# Patient Record
Sex: FEMALE | Race: WHITE | NOT HISPANIC OR LATINO | Employment: FULL TIME | URBAN - METROPOLITAN AREA
[De-identification: names, ages, dates, MRNs, and addresses within clinical notes are randomized per-mention and may not be internally consistent; named-entity substitution may affect disease eponyms.]

---
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
## 2019-01-30 DIAGNOSIS — N92.1 BREAKTHROUGH BLEEDING ON NEXPLANON: ICD-10-CM

## 2019-01-30 DIAGNOSIS — Z97.5 BREAKTHROUGH BLEEDING ON NEXPLANON: ICD-10-CM

## 2019-01-30 RX ORDER — NORGESTIMATE AND ETHINYL ESTRADIOL 0.25-0.035
1 KIT ORAL DAILY
Qty: 84 TABLET | Refills: 0 | Status: SHIPPED | OUTPATIENT
Start: 2019-01-30 | End: 2019-05-09

## 2019-01-30 NOTE — TELEPHONE ENCOUNTER
12/5/17 last office visit   Future appointment currently not scheduled.  Patient due for annual visit.  Called patient to notify, unable to reach, unable to leave a message as voice mail has not been set up. Noted on prescription refill that OFFICE VISIT is needed for additional refills.    Dasia Dhillon   [facility]  RN

## 2019-05-09 DIAGNOSIS — N92.1 BREAKTHROUGH BLEEDING ON NEXPLANON: ICD-10-CM

## 2019-05-09 DIAGNOSIS — Z97.5 BREAKTHROUGH BLEEDING ON NEXPLANON: ICD-10-CM

## 2019-05-09 RX ORDER — NORGESTIMATE AND ETHINYL ESTRADIOL 0.25-0.035
1 KIT ORAL DAILY
Qty: 84 TABLET | Refills: 0 | Status: SHIPPED | OUTPATIENT
Start: 2019-05-09 | End: 2019-05-16

## 2019-05-09 NOTE — TELEPHONE ENCOUNTER
Future appointment 5/16/19.    Refill through to appointment approved.    Dasia Dhillon   [facility]  RN

## 2019-05-16 ENCOUNTER — OFFICE VISIT (OUTPATIENT)
Dept: OBGYN | Facility: CLINIC | Age: 19
End: 2019-05-16
Payer: COMMERCIAL

## 2019-05-16 VITALS
BODY MASS INDEX: 23.9 KG/M2 | TEMPERATURE: 98.3 F | DIASTOLIC BLOOD PRESSURE: 76 MMHG | WEIGHT: 140 LBS | HEART RATE: 82 BPM | RESPIRATION RATE: 16 BRPM | SYSTOLIC BLOOD PRESSURE: 121 MMHG | HEIGHT: 64 IN

## 2019-05-16 DIAGNOSIS — Z30.41 ENCOUNTER FOR SURVEILLANCE OF CONTRACEPTIVE PILLS: Primary | ICD-10-CM

## 2019-05-16 DIAGNOSIS — Z11.3 SCREEN FOR STD (SEXUALLY TRANSMITTED DISEASE): ICD-10-CM

## 2019-05-16 PROCEDURE — 87491 CHLMYD TRACH DNA AMP PROBE: CPT | Performed by: OBSTETRICS & GYNECOLOGY

## 2019-05-16 PROCEDURE — 99213 OFFICE O/P EST LOW 20 MIN: CPT | Performed by: OBSTETRICS & GYNECOLOGY

## 2019-05-16 PROCEDURE — 87591 N.GONORRHOEAE DNA AMP PROB: CPT | Performed by: OBSTETRICS & GYNECOLOGY

## 2019-05-16 RX ORDER — NORGESTIMATE AND ETHINYL ESTRADIOL 0.25-0.035
1 KIT ORAL DAILY
Qty: 84 TABLET | Refills: 4 | Status: SHIPPED | OUTPATIENT
Start: 2019-05-16 | End: 2020-05-21

## 2019-05-16 ASSESSMENT — MIFFLIN-ST. JEOR: SCORE: 1395.04

## 2019-05-16 NOTE — NURSING NOTE
"Initial /76 (BP Location: Left arm, Patient Position: Sitting, Cuff Size: Adult Regular)   Pulse 82   Temp 98.3  F (36.8  C) (Tympanic)   Resp 16   Ht 1.626 m (5' 4\")   Wt 63.5 kg (140 lb)   LMP 05/16/2019   Breastfeeding? No   BMI 24.03 kg/m   Estimated body mass index is 24.03 kg/m  as calculated from the following:    Height as of this encounter: 1.626 m (5' 4\").    Weight as of this encounter: 63.5 kg (140 lb). .    Callie Fletcher, [facility]    "

## 2019-05-16 NOTE — PROGRESS NOTES
"  SUBJECTIVE:                                                   CC:  Patient presents with:  Consult      HPI:  Angeles Duenas is a 18 year old  who presents for OCP surveillance.  No migraine, HTN, cardiac disease, liver or gallbladder dz.  No h/o VTE.  No issues remembering to take the pills.  S/p nexplanon, had BTB on it and had it removed 1.5 years ago.      ROS: 10 point ROS negative other than as listed above in HPI.    Gyn History:  Patient's last menstrual period was 2019.     Patient is sexually active.  Using oral contraceptives for contraception.     PMH, PSH, Soc Hx, Fam Hx, Meds, and allergies reviewed in Epic.    OBJECTIVE:     /76 (BP Location: Left arm, Patient Position: Sitting, Cuff Size: Adult Regular)   Pulse 82   Temp 98.3  F (36.8  C) (Tympanic)   Resp 16   Ht 1.626 m (5' 4\")   Wt 63.5 kg (140 lb)   LMP 2019   Breastfeeding? No   BMI 24.03 kg/m      Gen: Healthy appearing thin female, no acute distress, comfortable     ASSESSMENT/PLAN:                                                      1. Encounter for surveillance of contraceptive pills  Discussed ways to use continuously.  No contraindication at this time to continuing OCP use.  Failed nexplanon in the past.  We did discuss IUD for consideration in the future.    - norgestimate-ethinyl estradiol (ORTHO-CYCLEN/SPRINTEC) 0.25-35 MG-MCG tablet; Take 1 tablet by mouth daily  Dispense: 84 tablet; Refill: 4    2. Screen for STD (sexually transmitted disease)  - Neisseria gonorrhoeae PCR  - Chlamydia trachomatis PCR      Alejandra Cho MD, MPH  Obstetrics and Gynecology     Total time spent was 15 minutes; greater than 50% of time was spent in counseling and/or coordination of care for the above listed diagnoses, not including time spent on the procedure.   "

## 2019-05-17 LAB
C TRACH DNA SPEC QL NAA+PROBE: NEGATIVE
N GONORRHOEA DNA SPEC QL NAA+PROBE: NEGATIVE
SPECIMEN SOURCE: NORMAL
SPECIMEN SOURCE: NORMAL

## 2019-07-08 ENCOUNTER — TELEPHONE (OUTPATIENT)
Dept: OBGYN | Facility: CLINIC | Age: 19
End: 2019-07-08

## 2019-07-08 NOTE — TELEPHONE ENCOUNTER
Reason for call:  Patient reporting a symptom    Symptom or request: Got her period a week before she was on the sugar pills - was really heavy and then she still has it this week while on sugar pills - pretty heavy and consistent.  Just concerned as has not happened to her before.    Duration (how long have symptoms been present): 2 wks    Have you been treated for this before? No    Additional comments:     Phone Number patient can be reached at:  Cell number on file:    Telephone Information:   Mobile [phone]       Best Time:  any    Can we leave a detailed message on this number:  YES    Call taken on 7/8/2019 at 4:23 PM by Rosy Cortes

## 2019-07-08 NOTE — TELEPHONE ENCOUNTER
S-(situation): Started menses 4-5 days early.    B-(background): Pt been on this ocp several yrs.    A-(assessment): Period started off approx 4-5 days early with light bleeding and now has progressed to be like her regular period.  She reports that she is not having heavy bleeding nor light bleeding.  Concerned about pregnancy.  She has been taking her ocp faithfully everyday at the same time.    R-(recommendations): Pt advised that it is unlikely that she is pregnant if on menses at this time.  She can purchase home pregnancy test for reassurance and will have Dr. Cho review and advise if needs different ocp.    Krystal Mendoza  [facility] RN

## 2019-07-12 NOTE — TELEPHONE ENCOUNTER
Agree with RN assessment.  Nothing to worry about at this time; can rule out pregnancy with home UPT.  Would otherwise keep her on the same OCP for now.    Alejandra Cho MD, MPH  [facility]

## 2019-07-12 NOTE — TELEPHONE ENCOUNTER
Called patient and informed of Dr. Jeronimo Cho's recommendation below.     Tyron MEI RN   Specialty Clinics

## 2019-07-15 ENCOUNTER — RECORDS - HEALTHEAST (OUTPATIENT)
Dept: LAB | Facility: CLINIC | Age: 19
End: 2019-07-15

## 2019-07-16 LAB
C TRACH DNA SPEC QL PROBE+SIG AMP: NEGATIVE
N GONORRHOEA DNA SPEC QL NAA+PROBE: NEGATIVE

## 2019-07-24 ENCOUNTER — HOSPITAL ENCOUNTER (EMERGENCY)
Facility: CLINIC | Age: 19
Discharge: HOME | End: 2019-07-24
Attending: STUDENT IN AN ORGANIZED HEALTH CARE EDUCATION/TRAINING PROGRAM | Admitting: STUDENT IN AN ORGANIZED HEALTH CARE EDUCATION/TRAINING PROGRAM
Payer: COMMERCIAL

## 2019-07-24 ENCOUNTER — APPOINTMENT (OUTPATIENT)
Dept: CT IMAGING | Facility: CLINIC | Age: 19
End: 2019-07-24
Attending: STUDENT IN AN ORGANIZED HEALTH CARE EDUCATION/TRAINING PROGRAM
Payer: COMMERCIAL

## 2019-07-24 VITALS
BODY MASS INDEX: 22.88 KG/M2 | HEIGHT: 64 IN | OXYGEN SATURATION: 100 % | DIASTOLIC BLOOD PRESSURE: 79 MMHG | SYSTOLIC BLOOD PRESSURE: 123 MMHG | TEMPERATURE: 98.7 F | WEIGHT: 134 LBS | RESPIRATION RATE: 18 BRPM

## 2019-07-24 DIAGNOSIS — M54.50 ACUTE LEFT-SIDED LOW BACK PAIN WITHOUT SCIATICA: ICD-10-CM

## 2019-07-24 DIAGNOSIS — R31.0 GROSS HEMATURIA: ICD-10-CM

## 2019-07-24 LAB
ALBUMIN SERPL-MCNC: 3.6 G/DL (ref 3.4–5)
ALBUMIN UR-MCNC: NEGATIVE MG/DL
ALP SERPL-CCNC: 72 U/L (ref 40–150)
ALT SERPL W P-5'-P-CCNC: 18 U/L (ref 0–50)
ANION GAP SERPL CALCULATED.3IONS-SCNC: 5 MMOL/L (ref 3–14)
APPEARANCE UR: ABNORMAL
AST SERPL W P-5'-P-CCNC: 14 U/L (ref 0–35)
B-HCG SERPL-ACNC: <1 IU/L (ref 0–5)
BASOPHILS # BLD AUTO: 0.1 10E9/L (ref 0–0.2)
BASOPHILS NFR BLD AUTO: 0.5 %
BILIRUB SERPL-MCNC: 0.3 MG/DL (ref 0.2–1.3)
BILIRUB UR QL STRIP: NEGATIVE
BUN SERPL-MCNC: 9 MG/DL (ref 7–30)
CALCIUM SERPL-MCNC: 8.6 MG/DL (ref 8.5–10.1)
CHLORIDE SERPL-SCNC: 107 MMOL/L (ref 96–110)
CO2 SERPL-SCNC: 28 MMOL/L (ref 20–32)
COLOR UR AUTO: YELLOW
CREAT SERPL-MCNC: 0.65 MG/DL (ref 0.5–1)
DIFFERENTIAL METHOD BLD: NORMAL
EOSINOPHIL # BLD AUTO: 0.1 10E9/L (ref 0–0.7)
EOSINOPHIL NFR BLD AUTO: 0.5 %
ERYTHROCYTE [DISTWIDTH] IN BLOOD BY AUTOMATED COUNT: 11.3 % (ref 10–15)
GFR SERPL CREATININE-BSD FRML MDRD: >90 ML/MIN/{1.73_M2}
GLUCOSE SERPL-MCNC: 94 MG/DL (ref 70–99)
GLUCOSE UR STRIP-MCNC: NEGATIVE MG/DL
HCT VFR BLD AUTO: 39.7 % (ref 35–47)
HGB BLD-MCNC: 13 G/DL (ref 11.7–15.7)
HGB UR QL STRIP: ABNORMAL
IMM GRANULOCYTES # BLD: 0 10E9/L (ref 0–0.4)
IMM GRANULOCYTES NFR BLD: 0.3 %
KETONES UR STRIP-MCNC: NEGATIVE MG/DL
LEUKOCYTE ESTERASE UR QL STRIP: NEGATIVE
LIPASE SERPL-CCNC: 81 U/L (ref 73–393)
LYMPHOCYTES # BLD AUTO: 2.5 10E9/L (ref 0.8–5.3)
LYMPHOCYTES NFR BLD AUTO: 26.6 %
MCH RBC QN AUTO: 28.8 PG (ref 26.5–33)
MCHC RBC AUTO-ENTMCNC: 32.7 G/DL (ref 31.5–36.5)
MCV RBC AUTO: 88 FL (ref 78–100)
MONOCYTES # BLD AUTO: 0.4 10E9/L (ref 0–1.3)
MONOCYTES NFR BLD AUTO: 4.2 %
MUCOUS THREADS #/AREA URNS LPF: PRESENT /LPF
NEUTROPHILS # BLD AUTO: 6.3 10E9/L (ref 1.6–8.3)
NEUTROPHILS NFR BLD AUTO: 67.9 %
NITRATE UR QL: NEGATIVE
NRBC # BLD AUTO: 0 10*3/UL
NRBC BLD AUTO-RTO: 0 /100
PH UR STRIP: 5 PH (ref 5–7)
PLATELET # BLD AUTO: 302 10E9/L (ref 150–450)
POTASSIUM SERPL-SCNC: 3.5 MMOL/L (ref 3.4–5.3)
PROT SERPL-MCNC: 6.7 G/DL (ref 6.8–8.8)
RBC # BLD AUTO: 4.52 10E12/L (ref 3.8–5.2)
RBC #/AREA URNS AUTO: 1 /HPF (ref 0–2)
SODIUM SERPL-SCNC: 140 MMOL/L (ref 133–144)
SOURCE: ABNORMAL
SP GR UR STRIP: 1.03 (ref 1–1.03)
SQUAMOUS #/AREA URNS AUTO: 3 /HPF (ref 0–1)
UROBILINOGEN UR STRIP-MCNC: 0 MG/DL (ref 0–2)
WBC # BLD AUTO: 9.3 10E9/L (ref 4–11)
WBC #/AREA URNS AUTO: 5 /HPF (ref 0–5)

## 2019-07-24 PROCEDURE — 84702 CHORIONIC GONADOTROPIN TEST: CPT | Performed by: STUDENT IN AN ORGANIZED HEALTH CARE EDUCATION/TRAINING PROGRAM

## 2019-07-24 PROCEDURE — 74177 CT ABD & PELVIS W/CONTRAST: CPT

## 2019-07-24 PROCEDURE — 96360 HYDRATION IV INFUSION INIT: CPT | Mod: 59 | Performed by: STUDENT IN AN ORGANIZED HEALTH CARE EDUCATION/TRAINING PROGRAM

## 2019-07-24 PROCEDURE — 96361 HYDRATE IV INFUSION ADD-ON: CPT | Performed by: STUDENT IN AN ORGANIZED HEALTH CARE EDUCATION/TRAINING PROGRAM

## 2019-07-24 PROCEDURE — 83690 ASSAY OF LIPASE: CPT | Performed by: STUDENT IN AN ORGANIZED HEALTH CARE EDUCATION/TRAINING PROGRAM

## 2019-07-24 PROCEDURE — 80053 COMPREHEN METABOLIC PANEL: CPT | Performed by: STUDENT IN AN ORGANIZED HEALTH CARE EDUCATION/TRAINING PROGRAM

## 2019-07-24 PROCEDURE — 25800030 ZZH RX IP 258 OP 636: Performed by: STUDENT IN AN ORGANIZED HEALTH CARE EDUCATION/TRAINING PROGRAM

## 2019-07-24 PROCEDURE — 81001 URINALYSIS AUTO W/SCOPE: CPT | Performed by: STUDENT IN AN ORGANIZED HEALTH CARE EDUCATION/TRAINING PROGRAM

## 2019-07-24 PROCEDURE — 99284 EMERGENCY DEPT VISIT MOD MDM: CPT | Mod: Z6 | Performed by: STUDENT IN AN ORGANIZED HEALTH CARE EDUCATION/TRAINING PROGRAM

## 2019-07-24 PROCEDURE — 85025 COMPLETE CBC W/AUTO DIFF WBC: CPT | Performed by: STUDENT IN AN ORGANIZED HEALTH CARE EDUCATION/TRAINING PROGRAM

## 2019-07-24 PROCEDURE — 25000125 ZZHC RX 250: Performed by: STUDENT IN AN ORGANIZED HEALTH CARE EDUCATION/TRAINING PROGRAM

## 2019-07-24 PROCEDURE — 25000128 H RX IP 250 OP 636: Performed by: STUDENT IN AN ORGANIZED HEALTH CARE EDUCATION/TRAINING PROGRAM

## 2019-07-24 PROCEDURE — 99285 EMERGENCY DEPT VISIT HI MDM: CPT | Mod: 25 | Performed by: STUDENT IN AN ORGANIZED HEALTH CARE EDUCATION/TRAINING PROGRAM

## 2019-07-24 RX ORDER — IOPAMIDOL 755 MG/ML
65 INJECTION, SOLUTION INTRAVASCULAR ONCE
Status: COMPLETED | OUTPATIENT
Start: 2019-07-24 | End: 2019-07-24

## 2019-07-24 RX ADMIN — SODIUM CHLORIDE 56 ML: 9 INJECTION, SOLUTION INTRAVENOUS at 21:49

## 2019-07-24 RX ADMIN — SODIUM CHLORIDE, POTASSIUM CHLORIDE, SODIUM LACTATE AND CALCIUM CHLORIDE 1000 ML: 600; 310; 30; 20 INJECTION, SOLUTION INTRAVENOUS at 20:25

## 2019-07-24 RX ADMIN — IOPAMIDOL 65 ML: 755 INJECTION, SOLUTION INTRAVENOUS at 21:49

## 2019-07-24 ASSESSMENT — MIFFLIN-ST. JEOR: SCORE: 1367.82

## 2019-07-25 NOTE — ED PROVIDER NOTES
History     Chief Complaint   Patient presents with     Hematuria     pt was seen by chiropractor today for back adjustment and went to pediatrician and was told blood in urine and was told to come to ER for CT scan to rule out kidney stone.      KAMI Duenas is a 18 year old female who presents for evaluation of left low back pain with hematuria.  Patient explains a nearly 2 weeks ago she had symptoms of gross hematuria without pain or discomfort, hematuria lasted around 1 day duration.  Early last week she began developing achy low back pain just above the gluteal region.  She saw chiropractor last week for an adjustment but did not obtain relief from left low back pain.  Patient again noted hematuria today with some dysuria prompting her to schedule a a primary clinic appointment, she was instructed to go to the department of possible kidney stone.  She denies fever, chills, chest pain, respiratory infectious symptoms, anterior abdominal pain, vaginal bleeding or discharge.  She had taken ibuprofen last at 12 PM with moderate improvement.  Declined analgesic medication now.    Allergies:  Allergies   Allergen Reactions     Amoxicillin Nausea     Penicillins Nausea       Problem List:    Patient Active Problem List    Diagnosis Date Noted     Encounter for surveillance of contraceptive pills 05/16/2019     Priority: Medium     Right knee pain 03/25/2016     Priority: Medium        Past Medical History:    Past Medical History:   Diagnosis Date     ADHD        Past Surgical History:    Past Surgical History:   Procedure Laterality Date     PE TUBES       TONSILLECTOMY & ADENOIDECTOMY         Family History:    Family History   Problem Relation Age of Onset     Hypertension Father      Hypertension Paternal Grandmother      Seasonal/Environmental Allergies Paternal Grandfather      Hypertension Paternal Grandfather      Heart Disease Brother         CHF       Social History:  Marital Status:  Single  "[1]  Social History     Tobacco Use     Smoking status: Never Smoker     Smokeless tobacco: Never Used   Substance Use Topics     Alcohol use: No     Drug use: Not on file        Medications:      amphetamine-dextroamphetamine (ADDERALL XR) 15 MG per 24 hr capsule   azelastine (ASTELIN) 0.1 % nasal spray   Fexofenadine HCl (MUCINEX ALLERGY PO)   fluticasone (FLONASE) 50 MCG/ACT spray   Loratadine (CLARITIN PO)   norgestimate-ethinyl estradiol (ORTHO-CYCLEN/SPRINTEC) 0.25-35 MG-MCG tablet         Review of Systems  Constitutional:  Negative for fever or chills.  Cardiovascular:  Negative for chest pain.  Respiratory:  Negative for cough or shortness of breath.  Gastrointestinal:  Negative for abdominal pain, vomiting, or diarrhea.  Denies blood with bowel movements.  Genitourinary: Positive for dysuria with gross hematuria.  Negative for pelvic pain, vaginal bleeding or discharge per  Musculoskeletal: Positive left low back pain.  Negative for midline back pain or recent injury.    All others reviewed and are negative.      Physical Exam   BP: 123/79  Heart Rate: 98  Temp: 98.7  F (37.1  C)  Resp: 18  Height: 162.6 cm (5' 4\")  Weight: 60.8 kg (134 lb)  SpO2: 100 %      Physical Exam  Constitutional:  Well developed, well nourished.  Appears nontoxic and in no acute distress.   HENT:  Normocephalic and atraumatic.  Symmetric in appearance.  Eyes:  Conjunctivae are normal.  Neck:  Neck supple.  Cardiovascular:  No cyanosis.  RRR.  No audible murmurs noted.    Respiratory:  Effort normal without sign of respiratory distress.  CTAB without diminished regions.   Gastrointestinal:  Soft nondistended abdomen.  Nontender and without guarding.  No rigidity or rebound tenderness.  Negative Solis's sign.  Negative McBurney's point.  Negative CVA tenderness.  Musculoskeletal:  Moves extremities spontaneously.  Left low back pain just above the gluteal musculature.  No step-offs or midline tenderness.  Neurological:  Patient is " alert.  Skin:  Skin is warm and dry.  Psychiatric:  Normal mood and affect.      ED Course        Procedures               Critical Care time:  none               Results for orders placed or performed during the hospital encounter of 07/24/19 (from the past 24 hour(s))   UA reflex to Microscopic and Culture   Result Value Ref Range    Color Urine Yellow     Appearance Urine Slightly Cloudy     Glucose Urine Negative NEG^Negative mg/dL    Bilirubin Urine Negative NEG^Negative    Ketones Urine Negative NEG^Negative mg/dL    Specific Gravity Urine 1.028 1.003 - 1.035    Blood Urine Large (A) NEG^Negative    pH Urine 5.0 5.0 - 7.0 pH    Protein Albumin Urine Negative NEG^Negative mg/dL    Urobilinogen mg/dL 0.0 0.0 - 2.0 mg/dL    Nitrite Urine Negative NEG^Negative    Leukocyte Esterase Urine Negative NEG^Negative    Source Midstream Urine     RBC Urine 1 0 - 2 /HPF    WBC Urine 5 0 - 5 /HPF    Squamous Epithelial /HPF Urine 3 (H) 0 - 1 /HPF    Mucous Urine Present (A) NEG^Negative /LPF   CBC with platelets differential   Result Value Ref Range    WBC 9.3 4.0 - 11.0 10e9/L    RBC Count 4.52 3.8 - 5.2 10e12/L    Hemoglobin 13.0 11.7 - 15.7 g/dL    Hematocrit 39.7 35.0 - 47.0 %    MCV 88 78 - 100 fl    MCH 28.8 26.5 - 33.0 pg    MCHC 32.7 31.5 - 36.5 g/dL    RDW 11.3 10.0 - 15.0 %    Platelet Count 302 150 - 450 10e9/L    Diff Method Automated Method     % Neutrophils 67.9 %    % Lymphocytes 26.6 %    % Monocytes 4.2 %    % Eosinophils 0.5 %    % Basophils 0.5 %    % Immature Granulocytes 0.3 %    Nucleated RBCs 0 0 /100    Absolute Neutrophil 6.3 1.6 - 8.3 10e9/L    Absolute Lymphocytes 2.5 0.8 - 5.3 10e9/L    Absolute Monocytes 0.4 0.0 - 1.3 10e9/L    Absolute Eosinophils 0.1 0.0 - 0.7 10e9/L    Absolute Basophils 0.1 0.0 - 0.2 10e9/L    Abs Immature Granulocytes 0.0 0 - 0.4 10e9/L    Absolute Nucleated RBC 0.0    Comprehensive metabolic panel   Result Value Ref Range    Sodium 140 133 - 144 mmol/L    Potassium 3.5 3.4  - 5.3 mmol/L    Chloride 107 96 - 110 mmol/L    Carbon Dioxide 28 20 - 32 mmol/L    Anion Gap 5 3 - 14 mmol/L    Glucose 94 70 - 99 mg/dL    Urea Nitrogen 9 7 - 30 mg/dL    Creatinine 0.65 0.50 - 1.00 mg/dL    GFR Estimate >90 >60 mL/min/[1.73_m2]    GFR Estimate If Black >90 >60 mL/min/[1.73_m2]    Calcium 8.6 8.5 - 10.1 mg/dL    Bilirubin Total 0.3 0.2 - 1.3 mg/dL    Albumin 3.6 3.4 - 5.0 g/dL    Protein Total 6.7 (L) 6.8 - 8.8 g/dL    Alkaline Phosphatase 72 40 - 150 U/L    ALT 18 0 - 50 U/L    AST 14 0 - 35 U/L   Lipase   Result Value Ref Range    Lipase 81 73 - 393 U/L   HCG quantitative pregnancy (blood)   Result Value Ref Range    HCG Quantitative Serum <1 0 - 5 IU/L   CT Abdomen Pelvis w Contrast    Narrative    CT ABDOMEN AND PELVIS WITH CONTRAST 7/24/2019 9:56 PM     HISTORY: Left low back pain with hematuria.    COMPARISON: None.    TECHNIQUE: Volumetric helical acquisition of CT images from the lung  bases through the symphysis pubis after the administration of 65 mL  Isovue intravenous contrast. Radiation dose for this scan was reduced  using automated exposure control, adjustment of the mA and/or kV  according to patient size, or iterative reconstruction technique.    FINDINGS: The liver, bilateral kidneys and adrenal glands, pancreas,  and spleen demonstrate no worrisome focal lesion. Unremarkable  appendix. No hydronephrosis or urolithiasis. Unremarkable gallbladder.  No diverticulitis. Normal caliber aorta. Pelvic structures  unremarkable. There is no free fluid in the abdomen or pelvis. No free  air in the abdomen. Bone windows reveal no destructive lesions. There  are no abdominal or pelvic lymph nodes that are abnormal by size  criteria. The visualized lung bases are unremarkable. There are no  dilated loops of small bowel or colon.      Impression    IMPRESSION: No acute process demonstrated within the abdomen and  pelvis.       Medications   lactated ringers BOLUS 1,000 mL (0 mLs Intravenous  Stopped 7/24/19 2207)   iopamidol (ISOVUE-370) solution 65 mL (65 mLs Intravenous Given 7/24/19 2149)   sodium chloride 0.9 % bag 500mL for CT scan flush use (56 mLs As instructed Given 7/24/19 2149)       Assessments & Plan (with Medical Decision Making)   Angeles Duenas is a 18 year old female who presents to the department for evaluation of gross hematuria and left low back pain.  Patient's initial episode of gross hematuria occurred 2 weeks ago, before back pain started.  She has had >1 week of left low back pain but seems to localize just above the gluteal musculature as opposed to over the flank region as would be expected with hydronephrosis.  She had reported a second episode of gross hematuria today, is not currently menstruating, and urinalysis without significant RBCs her urinalysis is also unimpressive for infection.  CT scan of abdomen/pelvis independently reviewed and no sign of ureterolithiasis, also no notable adnexal findings.  Clinical impression is that she seems to be suffering from musculoskeletal low back pain but also intermittent gross hematuria.  These symptoms are likely of separate etiologies, recommend follow-up with both orthopedics and urology clinic for reevaluation and continued management plan.        Disclaimer:  This note consists of symbols derived from keyboarding, dictation, and/or voice recognition software.  As a result, there may be errors in the script that have gone undetected.  Please consider this when interpreting information found in the chart.        I have reviewed the nursing notes.    I have reviewed the findings, diagnosis, plan and need for follow up with the patient.         Medication List      There are no discharge medications for this visit.         Final diagnoses:   Acute left-sided low back pain without sciatica   Gross hematuria       7/24/2019   [facility] EMERGENCY DEPARTMENT     Jamil Finnegan,   07/24/19 [id number]

## 2019-07-26 ENCOUNTER — TELEPHONE (OUTPATIENT)
Dept: OBGYN | Facility: CLINIC | Age: 19
End: 2019-07-26

## 2019-07-26 NOTE — TELEPHONE ENCOUNTER
Reason for Call:  Other call back    Detailed comments: pt calling stating she was in the UC/ ER on 7/24 for blood in urine. No kidney stones, did CT scan, and pregnancy test, still bleeding. Not due for period until early aug.     Phone Number Patient can be reached at: Cell number on file:    Telephone Information:   Mobile [phone]       Best Time: any     Can we leave a detailed message on this number? YES    Call taken on 7/26/2019 at 1:27 PM by Rylie Mckeon

## 2019-07-26 NOTE — TELEPHONE ENCOUNTER
"Spoke to patient, she was seen in ER but is not PG and does not have a UTI.     \"I reinaldo have bad cramps and bad lower back pain for  the last 3 weeks but it started Monday and this happened before 3 weeks ago.\"    Is on BCP and reports she is taking BCP on time and daily as ordered.    Rates pain: \"4\" of 10. \"it's constant and uncomfortable..\" IS taking Advil TID as ordered told to by ER Provider.    \"it feels like I am on a heavy period, I had to wear a pad all day and it was super dark brown and reinaldo came out in little clots, earlier this week, but today every time I go to the bathroom it is bright red and colors the toilet water red....\"    \"I am a little dizzy if I stand up too fast..\"     Is drinking and is urinating usual amounts.    Patient is unsure if blood is coming from vaginal canal or urethra. Denies Mucus in urine or exposure to STD's.     \"The ER told me to take Advil three times a day for a week, so I have been doing that but it is not helping..\"     Since bleeding could be worsened by Advil and patient is not sure if blood is coming from vaginal canal or urethra, and she reports constant pain of 4 of 10 on TID Advil, advised to be seen again as she reports \"I am having surgery on my nose on August 9 th and I am supposed to see a Urologist, but I didn't make the appointment yet, because my Mom doesn't want my surgery not to take place and she didn't want anything in the way..\"     Patient verbalized understanding. Joycelyn Osman RN        "

## 2019-07-31 ENCOUNTER — OFFICE VISIT (OUTPATIENT)
Dept: FAMILY MEDICINE | Facility: CLINIC | Age: 19
End: 2019-07-31
Payer: COMMERCIAL

## 2019-07-31 VITALS
BODY MASS INDEX: 23.05 KG/M2 | HEIGHT: 64 IN | HEART RATE: 105 BPM | TEMPERATURE: 98.8 F | RESPIRATION RATE: 16 BRPM | SYSTOLIC BLOOD PRESSURE: 120 MMHG | WEIGHT: 135 LBS | OXYGEN SATURATION: 99 % | DIASTOLIC BLOOD PRESSURE: 70 MMHG

## 2019-07-31 DIAGNOSIS — N92.6 IRREGULAR MENSES: Primary | ICD-10-CM

## 2019-07-31 LAB
HCG UR QL: NEGATIVE
TSH SERPL DL<=0.005 MIU/L-ACNC: 1.96 MU/L (ref 0.4–4)

## 2019-07-31 PROCEDURE — 84443 ASSAY THYROID STIM HORMONE: CPT | Performed by: NURSE PRACTITIONER

## 2019-07-31 PROCEDURE — 36415 COLL VENOUS BLD VENIPUNCTURE: CPT | Performed by: NURSE PRACTITIONER

## 2019-07-31 PROCEDURE — 99213 OFFICE O/P EST LOW 20 MIN: CPT | Performed by: NURSE PRACTITIONER

## 2019-07-31 PROCEDURE — 81025 URINE PREGNANCY TEST: CPT | Performed by: NURSE PRACTITIONER

## 2019-07-31 ASSESSMENT — MIFFLIN-ST. JEOR: SCORE: 1372.36

## 2019-07-31 NOTE — PROGRESS NOTES
Angeles Duenas is a 18 year old female who presents to clinic today for the following health issues:    HPI   ED/UC Followup:    Facility:  [facility] [place]   Date of visit: 7/24/2019  Reason for visit: Irregular Bleeding   Current Status: still has period and has cramps real bad      Patient states that she has had a regular period for 1 year after Nexplanon was removed on the BCP.  For the last 2-3 weeks she has been having cramping and now heavy bleeding for 1 week prior to period and continues to be persistent since ER visit on 7/24/19.  Patient is currently admitting to 3-4 tampons/day and a pad at night.  The cramping includes back pain but correlates with these.  She states that she has not missed a dose of her BCP.  She had HCG, Lipase, CMP, CBC, and UA which were all normal at that time and Abdominal CT which was negative.  Patient usually see's Dr. Cho in OB for her women's health.    Patient Active Problem List   Diagnosis     Right knee pain     Encounter for surveillance of contraceptive pills     Past Surgical History:   Procedure Laterality Date     PE TUBES       TONSILLECTOMY & ADENOIDECTOMY         Social History     Tobacco Use     Smoking status: Never Smoker     Smokeless tobacco: Never Used   Substance Use Topics     Alcohol use: No     Family History   Problem Relation Age of Onset     Hypertension Father      Hypertension Paternal Grandmother      Seasonal/Environmental Allergies Paternal Grandfather      Hypertension Paternal Grandfather      Heart Disease Brother         CHF         Current Outpatient Medications   Medication Sig Dispense Refill     amphetamine-dextroamphetamine (ADDERALL XR) 15 MG per 24 hr capsule Take 15 mg by mouth daily  0     norgestimate-ethinyl estradiol (ORTHO-CYCLEN/SPRINTEC) 0.25-35 MG-MCG tablet Take 1 tablet by mouth daily 84 tablet 4     azelastine (ASTELIN) 0.1 % nasal spray Spray 2 sprays into both nostrils 2 times daily as needed for rhinitis or allergies  "(Patient not taking: Reported on 7/31/2019) 30 mL 2     Fexofenadine HCl (MUCINEX ALLERGY PO)        fluticasone (FLONASE) 50 MCG/ACT spray Spray 1 spray into both nostrils daily       Loratadine (CLARITIN PO)        Allergies   Allergen Reactions     Amoxicillin Nausea     Penicillins Nausea       Reviewed and updated as needed this visit by Provider  Tobacco  Allergies  Meds  Problems  Med Hx  Surg Hx  Fam Hx         Review of Systems   ROS COMP: CONSTITUTIONAL: NEGATIVE for fever, chills, change in weight  ENT/MOUTH: NEGATIVE for ear, mouth and throat problems  RESP: NEGATIVE for significant cough or SOB  CV: NEGATIVE for chest pain, palpitations or peripheral edema  GI: NEGATIVE for nausea, abdominal pain, heartburn, or change in bowel habits  : normal menstrual cycles, abnormal menstrual cycles, cramping with low back pain and heavy menses per patient      Objective    /70   Pulse 105   Temp 98.8  F (37.1  C) (Tympanic)   Resp 16   Ht 1.626 m (5' 4\")   Wt 61.2 kg (135 lb)   SpO2 99%   BMI 23.17 kg/m    Body mass index is 23.17 kg/m .  Physical Exam   GENERAL: healthy, alert and no distress  RESP: lungs clear to auscultation - no rales, rhonchi or wheezes  CV: regular rate and rhythm, normal S1 S2, no S3 or S4, no murmur, click or rub, no peripheral edema and peripheral pulses strong  ABDOMEN: tenderness lower midline seems mild, no organomegaly or masses and bowel sounds normal  PSYCH: mentation appears normal, affect normal/bright    Diagnostic Test Results:  Labs reviewed in Epic  Results for orders placed or performed in visit on 07/31/19   TSH   Result Value Ref Range    TSH 1.96 0.40 - 4.00 mU/L   HCG Qual, Urine ([id number])   Result Value Ref Range    HCG Qual Urine Negative NEG^Negative           Assessment & Plan     1. Irregular menses  TSH and Hcg are negative.  Ordered pelvic US for further evaluation.  I will call with results.  Discussed with patient that she follow-up with  " Tammie for further evaluation.  - TSH  - HCG Qual, Urine ([id number])  - US Pelvic Complete with Transvaginal; Future       See Patient Instructions    Return in about 1 week (around 8/7/2019), or if symptoms worsen or fail to improve.    Irma Gonzales, JACLYN  [facility]    Addendum:  Called patient with results on US which was normal.  Recommended follow-up with Dr. Cho for further evaluation and treatment, discussed possible IUD placement.

## 2019-07-31 NOTE — LETTER
August 2, 2019      Angeles Duenas  [address] [facility] [address]        Dear ,    We are writing to inform you of your test results.    TSH was negative/ within normal limits.    TSH   Date Value Ref Range Status   07/31/2019 1.96 0.40 - 4.00 mU/L Final       If you have any questions or concerns, please call the clinic at the number listed above.       Sincerely,        Irma Gonzales NP

## 2019-07-31 NOTE — PROGRESS NOTES
"Angeles Duenas is a 18 year old female who presents to clinic today for the following health issues:    HPI   Vaginal Bleeding (Dysmenorrhea)      Onset: 2 1/12 week     Description:  Duration of bleeding episodes: gets her period at random times throughout the month and is not suppose to be on it due to being on birth control pill    Describe bleeding/flow:   Clots: YES- at the beginning  Number of tampons/hour: 4   Wears a pad at night and wakes with that soiled   Cramping: moderate    Intensity:  moderate    Accompanying signs and symptoms: Is currently taking Birth Control pills for the past 1 year. Prior to the pill she had nexplanon.     History (similar episodes/previous evaluation): None    Precipitating or alleviating factors: None    Therapies tried and outcome: None    {additonal problems for provider to add (Optional):153814}    {HIST REVIEW/ LINKS 2 (Optional):374513}    {Additional problems for the provider to add (optional):208010}  Reviewed and updated as needed this visit by Provider         Review of Systems   {ROS COMP (Optional):418657}      Objective    There were no vitals taken for this visit.  There is no height or weight on file to calculate BMI.  Physical Exam   {Exam List (Optional):411614}    {Diagnostic Test Results (Optional):544191::\"Diagnostic Test Results:\",\"Labs reviewed in Epic\"}        {PROVIDER CHARTING PREFERENCE:944451}      "

## 2019-07-31 NOTE — PATIENT INSTRUCTIONS
1.  I will call you with thyroid and US results.  2.  Make appointment in [facility] for US.  [phone]  3.  If symptoms are persistent and testing is normal, I recommend you make appointment with Dr. Cho.  4.  Urine pregnancy is negative.

## 2019-08-02 ENCOUNTER — ANCILLARY PROCEDURE (OUTPATIENT)
Dept: ULTRASOUND IMAGING | Facility: CLINIC | Age: 19
End: 2019-08-02
Attending: NURSE PRACTITIONER
Payer: COMMERCIAL

## 2019-08-02 DIAGNOSIS — N92.6 IRREGULAR MENSES: ICD-10-CM

## 2019-08-02 PROCEDURE — 76856 US EXAM PELVIC COMPLETE: CPT | Mod: 59

## 2019-08-02 PROCEDURE — 76830 TRANSVAGINAL US NON-OB: CPT

## 2019-08-05 ENCOUNTER — RECORDS - HEALTHEAST (OUTPATIENT)
Dept: LAB | Facility: CLINIC | Age: 19
End: 2019-08-05

## 2019-08-06 LAB
C TRACH DNA SPEC QL PROBE+SIG AMP: NEGATIVE
N GONORRHOEA DNA SPEC QL NAA+PROBE: NEGATIVE

## 2020-05-21 DIAGNOSIS — Z30.41 ENCOUNTER FOR SURVEILLANCE OF CONTRACEPTIVE PILLS: ICD-10-CM

## 2020-05-21 RX ORDER — NORGESTIMATE AND ETHINYL ESTRADIOL 0.25-0.035
1 KIT ORAL DAILY
Qty: 84 TABLET | Refills: 0 | Status: SHIPPED | OUTPATIENT
Start: 2020-05-21

## 2020-05-21 NOTE — TELEPHONE ENCOUNTER
5/16/19 last office visit with Dr. Isaac   Consult for surveillance of OCP  Future appointment currently not scheduled.    Unable to refill under Dr. Isaac   Please review and advise.    Patient can do virtual visit for additional refills.  She will call to make an appointment.    Dasia Dhillon   [facility]  RN

## 2020-08-11 ENCOUNTER — AMBULATORY - HEALTHEAST (OUTPATIENT)
Dept: FAMILY MEDICINE | Facility: CLINIC | Age: 20
End: 2020-08-11

## 2020-08-11 ENCOUNTER — VIRTUAL VISIT (OUTPATIENT)
Dept: FAMILY MEDICINE | Facility: OTHER | Age: 20
End: 2020-08-11
Payer: COMMERCIAL

## 2020-08-11 DIAGNOSIS — Z20.822 SUSPECTED COVID-19 VIRUS INFECTION: ICD-10-CM

## 2020-08-11 PROCEDURE — 99421 OL DIG E/M SVC 5-10 MIN: CPT | Performed by: PHYSICIAN ASSISTANT

## 2020-08-11 NOTE — PROGRESS NOTES
"Date: 2020 14:02:50  Clinician: Suhas Ackerman  Clinician NPI: [id number]  Patient: Angeles Duenas  Patient : 2000  Patient Address: [address] [place] [address]  Patient Phone: [phone]  Visit Protocol: URI  Patient Summary:  Angeles is a 19 year old ( : 2000 ) female who initiated a Visit for COVID-19 (Coronavirus) evaluation and screening. When asked the question \"Please sign me up to receive news, health information and promotions from [employer].\", Angeles responded \"No\".    Angeles states her symptoms started gradually 5-6 days ago. After her symptoms started, they improved and then got worse again.   Her symptoms consist of a headache, chills, tooth pain, diarrhea, a cough, myalgia, anosmia, facial pain or pressure, and malaise. Angeles also feels feverish.   Symptom details     Cough: Angeles coughs a few times an hour and her cough is not more bothersome at night. Phlegm comes into her throat when she coughs. She does not believe her cough is caused by post-nasal drip. The color of the phlegm is yellow and clear.     Temperature: Her current temperature is 99.5 degrees Fahrenheit.     Facial pain or pressure: The facial pain or pressure feels worse when bending over or leaning forward.     Headache: She states the headache is moderate (4-6 on a 10 point pain scale).     Tooth pain: The tooth pain is not caused by a cavity, recent dental work, or other mouth problems.      Angeles denies having ear pain, enlarged lymph nodes, nausea, sore throat, ageusia, nasal congestion, vomiting, rhinitis, and wheezing. She also denies having a sinus infection within the past year, taking antibiotic medication in the past month, and having recent facial or sinus surgery in the past 60 days. She is not experiencing dyspnea.   Precipitating events  She has recently been exposed to someone with influenza. Angeles has been in close contact with the following high risk individuals: " immunocompromised people and children under the age of 5.   Pertinent COVID-19 (Coronavirus) information  In the past 14 days, Angeles has not worked in a congregate living setting.   She does not work or volunteer as healthcare worker or a  and does not work or volunteer in a healthcare facility.   Angeles also has not lived in a congregate living setting in the past 14 days. She does not live with a healthcare worker.   Angeles has not had a close contact with a laboratory-confirmed COVID-19 patient within 14 days of symptom onset.   Since December 2019, Angeles and has not had upper respiratory infection or influenza-like illness. Has not been diagnosed with lab-confirmed COVID-19 test   Pertinent medical history  Angeles typically gets a yeast infection when she takes antibiotics. She is not sure if she has used fluconazole (Diflucan) to treat previous yeast infections.   Angeles does not need a return to work/school note.   Weight: 135 lbs   Angeles does not smoke or use smokeless tobacco.   She denies pregnancy and denies breastfeeding. She has menstruated in the past month.   Weight: 135 lbs    MEDICATIONS: Estarylla oral, Vyvanse oral, ALLERGIES: Penicillins  Clinician Response:  Dear Angeles,   Your symptoms show that you may have coronavirus (COVID-19). This illness can cause fever, cough and trouble breathing. Many people get a mild case and get better on their own. Some people can get very sick.  What should I do?  We would like to test you for this virus.   1. Please call [phone] to schedule your visit. Explain that [facility]. Be ready to share your [facility] ID [facility].  The following will serve as your written order for this COVID Test, ordered by me, for the indication of suspected COVID [Z20.828]: The test will be ordered in [employer], our electronic health record, after you are scheduled. It will show as ordered and authorized by  "Prince Jean MD.  Order: COVID-19 (Coronavirus) PCR for SYMPTOMATIC testing from [facility].      2. When it's time for your COVID test:  Stay at least 6 feet away from others. (If someone will drive you to your test, stay in the backseat, as far away from the  as you can.)   Cover your mouth and nose with a mask, tissue or washcloth.  Go straight to the testing site. Don't make any stops on the way there or back.      3.Starting now: Stay home and away from others (self-isolate) until:   You've had no fever---and no medicine that reduces fever---for one full day (24 hours). And...   Your other symptoms have gotten better. For example, your cough or breathing has improved. And...   At least 10 days have passed since your symptoms started.       During this time, don't leave the house except for testing or medical care.   Stay in your own room, even for meals. Use your own bathroom if you can.   Stay away from others in your home. No hugging, kissing or shaking hands. No visitors.  Don't go to work, school or anywhere else.    Clean \"high touch\" surfaces often (doorknobs, counters, handles, etc.). Use a household cleaning spray or wipes. You'll find a full list of  on the EPA website: www.epa.gov/pesticide-registration/list-n-disinfectants-use-against-sars-cov-2.   Cover your mouth and nose with a mask, tissue or washcloth to avoid spreading germs.  Wash your hands and face often. Use soap and water.  Caregivers in these groups are at risk for severe illness due to COVID-19:  o People 65 years and older  o People who live in a nursing home or long-term care facility  o People with chronic disease (lung, heart, cancer, diabetes, kidney, liver, immunologic)  o People who have a weakened immune system, including those who:   Are in cancer treatment  Take medicine that weakens the immune system, such as corticosteroids  Had a bone marrow or organ transplant  Have an immune deficiency  Have poorly controlled HIV " or AIDS  Are obese (body mass index of 40 or higher)  Smoke regularly   o Caregivers should wear gloves while washing dishes, handling laundry and cleaning bedrooms and bathrooms.  o Use caution when washing and drying laundry: Don't shake dirty laundry, and use the warmest water setting that you can.  o For more tips, go to www.cdc.gov/coronavirus/2019-ncov/downloads/10Things.pdf.    4.Sign up for [employer]. We know it's scary to hear that you might have COVID-19. We want to track your symptoms to make sure you're okay over the next 2 weeks. Please look for an email from [employer]---this is a free, online program that we'll use to keep in touch. To sign up, follow the link in the email. Learn more at [email or web address]  How can I take care of myself?   Get lots of rest. Drink extra fluids (unless a doctor has told you not to).   Take Tylenol (acetaminophen) for fever or pain. If you have liver or kidney problems, ask your family doctor if it's okay to take Tylenol.   Adults can take either:    650 mg (two 325 mg pills) every 4 to 6 hours, or...   1,000 mg (two 500 mg pills) every 8 hours as needed.    Note: Don't take more than 3,000 mg in one day. Acetaminophen is found in many medicines (both prescribed and over-the-counter medicines). Read all labels to be sure you don't take too much.   For children, check the Tylenol bottle for the right dose. The dose is based on the child's age or weight.    If you have other health problems (like cancer, heart failure, an organ transplant or severe kidney disease): Call your specialty clinic if you don't feel better in the next 2 days.       Know when to call 911. Emergency warning signs include:    Trouble breathing or shortness of breath Pain or pressure in the chest that doesn't go away Feeling confused like you haven't felt before, or not being able to wake up Bluish-colored lips or face.  Where can I get more information?   [facility] --  About COVID-19: [email or web address]   CDC -- What to Do If You're Sick: www.cdc.gov/coronavirus/2019-ncov/about/steps-when-sick.html   CDC -- Ending Home Isolation: www.cdc.gov/coronavirus/2019-ncov/hcp/disposition-in-home-patients.html   [facility] -- Caring for Someone: www.cdc.gov/coronavirus/2019-ncov/if-you-are-sick/care-for-someone.html   [facility] -- Interim Guidance for Hospital Discharge to Home: www.[email or web address].[place]./diseases/coronavirus/hcp/hospdischarge.pdf   [facility] clinical trials (COVID-19 research studies): [email or web address].[facility]-clinical-trials    Below are the COVID-19 hotlines at the [place] [facility] ([facility]). Interpreters are available.    For health questions: Call 337-548-7393 or 1-986.707.4676 (7 a.m. to 7 p.m.) For questions about schools and childcare: Call 816-251-3890 or 1-710.863.8104 (7 a.m. to 7 p.m.)    Diagnosis: Other malaise  Diagnosis ICD: R53.81

## 2020-08-12 ENCOUNTER — AMBULATORY - HEALTHEAST (OUTPATIENT)
Dept: FAMILY MEDICINE | Facility: CLINIC | Age: 20
End: 2020-08-12

## 2020-08-12 DIAGNOSIS — Z20.822 SUSPECTED COVID-19 VIRUS INFECTION: ICD-10-CM

## 2020-08-14 ENCOUNTER — COMMUNICATION - HEALTHEAST (OUTPATIENT)
Dept: SCHEDULING | Facility: CLINIC | Age: 20
End: 2020-08-14

## 2021-05-25 ENCOUNTER — RECORDS - HEALTHEAST (OUTPATIENT)
Dept: ADMINISTRATIVE | Facility: CLINIC | Age: 21
End: 2021-05-25

## 2022-02-17 PROBLEM — Z97.5 NEXPLANON IN PLACE: Status: RESOLVED | Noted: 2017-01-16 | Resolved: 2019-05-16
